# Patient Record
Sex: MALE | URBAN - METROPOLITAN AREA
[De-identification: names, ages, dates, MRNs, and addresses within clinical notes are randomized per-mention and may not be internally consistent; named-entity substitution may affect disease eponyms.]

---

## 2017-04-29 ENCOUNTER — LAB ENCOUNTER (OUTPATIENT)
Dept: LAB | Facility: HOSPITAL | Age: 37
End: 2017-04-29
Attending: FAMILY MEDICINE
Payer: COMMERCIAL

## 2017-04-29 DIAGNOSIS — Z00.00 ROUTINE GENERAL MEDICAL EXAMINATION AT A HEALTH CARE FACILITY: Primary | ICD-10-CM

## 2017-04-29 PROCEDURE — 85025 COMPLETE CBC W/AUTO DIFF WBC: CPT

## 2017-04-29 PROCEDURE — 36415 COLL VENOUS BLD VENIPUNCTURE: CPT

## 2017-04-29 PROCEDURE — 80053 COMPREHEN METABOLIC PANEL: CPT

## 2017-04-29 PROCEDURE — 80061 LIPID PANEL: CPT

## 2024-01-11 ENCOUNTER — HOSPITAL ENCOUNTER (OUTPATIENT)
Age: 44
Discharge: HOME | End: 2024-01-11
Payer: COMMERCIAL

## 2024-01-11 VITALS
DIASTOLIC BLOOD PRESSURE: 84 MMHG | OXYGEN SATURATION: 99 % | HEART RATE: 79 BPM | TEMPERATURE: 99 F | RESPIRATION RATE: 18 BRPM | SYSTOLIC BLOOD PRESSURE: 129 MMHG

## 2024-01-11 DIAGNOSIS — J02.0 STREPTOCOCCAL SORE THROAT: Primary | ICD-10-CM

## 2024-01-11 LAB — S PYO AG THROAT QL: POSITIVE

## 2024-01-11 PROCEDURE — 99203 OFFICE O/P NEW LOW 30 MIN: CPT | Performed by: NURSE PRACTITIONER

## 2024-01-11 PROCEDURE — 87880 STREP A ASSAY W/OPTIC: CPT | Performed by: NURSE PRACTITIONER

## 2024-01-11 RX ORDER — AMOXICILLIN 500 MG/1
500 TABLET, FILM COATED ORAL 2 TIMES DAILY
Qty: 20 TABLET | Refills: 0 | Status: SHIPPED | OUTPATIENT
Start: 2024-01-11 | End: 2024-01-21

## 2024-01-11 NOTE — ED INITIAL ASSESSMENT (HPI)
Pt c/o sore throat and fever, which started last night. Strep positive contact. Alternating tylenol and ibuprofen

## 2024-01-12 NOTE — ED PROVIDER NOTES
Patient Seen in: [facility]      History     Chief Complaint   Patient presents with    Sore Throat    Fever     Stated Complaint: sore throat,fever  Subjective:   43-year-old male presents for a sore throat and fever that started last night.  His son is currently being treated for strep throat.  No difficulty swallowing.  Speech is clear.  He is eating and drinking without vomiting or diarrhea.  No neck stiffness.  No rashes.  No headaches.  No dizziness.  He appears well and nontoxic.      Objective:   History reviewed. No pertinent past medical history.         History reviewed. No pertinent surgical history.           Social History     Socioeconomic History    Marital status:             Review of Systems   Constitutional:  Positive for fever.   HENT:  Positive for sore throat.    All other systems reviewed and are negative.      Positive for stated complaint: sore throat,fever  Other systems are as noted in HPI.  Constitutional and vital signs reviewed.      All other systems reviewed and negative except as noted above.    Physical Exam     ED Triage Vitals [01/11/24 1743]   /84   Pulse 79   Resp 18   Temp 98.6 °F (37 °C)   Temp src Oral   SpO2 99 %   O2 Device None (Room air)     Current:/84   Pulse 79   Temp 98.6 °F (37 °C) (Oral)   Resp 18   SpO2 99%     Physical Exam  Vitals and nursing note reviewed.   Constitutional:       General: He is not in acute distress.     Appearance: He is well-developed. He is not ill-appearing or toxic-appearing.   HENT:      Head: Normocephalic.      Right Ear: Tympanic membrane normal.      Left Ear: Tympanic membrane normal.      Nose: No congestion or rhinorrhea.      Mouth/Throat:      Mouth: Mucous membranes are moist. No oral lesions.      Pharynx: Oropharynx is clear. Posterior oropharyngeal erythema present. No pharyngeal swelling, oropharyngeal exudate or uvula swelling.      Tonsils: No tonsillar exudate or tonsillar  abscesses.   Eyes:      Conjunctiva/sclera: Conjunctivae normal.   Cardiovascular:      Rate and Rhythm: Normal rate and regular rhythm.      Heart sounds: Normal heart sounds. No murmur heard.  Pulmonary:      Effort: Pulmonary effort is normal.      Breath sounds: Normal breath sounds. No wheezing, rhonchi or rales.   Musculoskeletal:      Cervical back: Normal range of motion and neck supple.   Lymphadenopathy:      Cervical: No cervical adenopathy.   Skin:     General: Skin is warm and dry.      Capillary Refill: Capillary refill takes less than 2 seconds.   Neurological:      General: No focal deficit present.      Mental Status: He is alert and oriented to person, place, and time.   Psychiatric:         Mood and Affect: Mood normal.         Behavior: Behavior normal.         ED Course     Labs Reviewed   POCT RAPID STREP - Abnormal; Notable for the following components:       Result Value    POCT Rapid Strep Positive (*)     All other components within normal limits         MDM       Medical Decision Making  The strep test is positive.  I will treat with amoxicillin.  We discussed plan of care including Tylenol or Motrin as needed for pain or fever, pushing fluids, and rest.  He will follow-up as needed with his primary care doctor.    Amount and/or Complexity of Data Reviewed  Labs: ordered.     Details: Strep positive    Risk  OTC drugs.  Prescription drug management.        Disposition and Plan     Clinical Impression:  1. Streptococcal sore throat         Disposition:  Discharge  1/11/2024  6:04 pm    Follow-up:  Zeenat Velasquez  [address]  [place] [address]  [phone]    Schedule an appointment as soon as possible for a visit   As needed          Medications Prescribed:  Discharge Medication List as of 1/11/2024  6:10 PM        START taking these medications    Details   amoxicillin 500 MG Oral Tab Take 1 tablet (500 mg total) by mouth 2 (two) times daily for 10 days., Normal, Disp-20  tablet, R-0

## 2024-01-12 NOTE — DISCHARGE INSTRUCTIONS
Push fluids.  Rest.  Tylenol or ibuprofen as needed for pain or fever.  Take the antibiotics as prescribed.  Follow-up as needed with your doctor.  Return for any concerns.

## 2025-01-30 ENCOUNTER — HOSPITAL ENCOUNTER (OUTPATIENT)
Age: 45
Discharge: HOME | End: 2025-01-30
Payer: COMMERCIAL

## 2025-01-30 VITALS
TEMPERATURE: 98 F | DIASTOLIC BLOOD PRESSURE: 67 MMHG | SYSTOLIC BLOOD PRESSURE: 120 MMHG | RESPIRATION RATE: 18 BRPM | HEART RATE: 57 BPM | OXYGEN SATURATION: 100 %

## 2025-01-30 DIAGNOSIS — B34.9 VIRAL SYNDROME: ICD-10-CM

## 2025-01-30 DIAGNOSIS — J02.9 SORE THROAT: Primary | ICD-10-CM

## 2025-01-30 LAB
POCT INFLUENZA A: NEGATIVE
POCT INFLUENZA B: NEGATIVE
S PYO AG THROAT QL: NEGATIVE

## 2025-01-30 PROCEDURE — 99213 OFFICE O/P EST LOW 20 MIN: CPT | Performed by: NURSE PRACTITIONER

## 2025-01-30 PROCEDURE — 87502 INFLUENZA DNA AMP PROBE: CPT | Performed by: NURSE PRACTITIONER

## 2025-01-30 PROCEDURE — 87880 STREP A ASSAY W/OPTIC: CPT | Performed by: NURSE PRACTITIONER

## 2025-01-30 NOTE — ED PROVIDER NOTES
Patient Seen in: [facility]      History     Chief Complaint   Patient presents with    Sore Throat     Stated Complaint: Sore Throat    Subjective:   HPI    44-year-old male here for evaluation of fatigue, body aches and sore throat that started 3 days ago.  His son is positive for flu a today.  He denies ear pain, abdominal pain, vomiting or diarrhea, cough or shortness of breath.  He is drinking fluids but has a loss of appetite.    Objective:     History reviewed. No pertinent past medical history.           History reviewed. No pertinent surgical history.             Social History     Socioeconomic History    Marital status:    Tobacco Use    Smoking status: Never    Smokeless tobacco: Never   Vaping Use    Vaping status: Never Used   Substance and Sexual Activity    Alcohol use: Yes     Comment: social    Drug use: Never     Social Drivers of Health     Financial Resource Strain: Low Risk  (11/22/2024)    Received from [facility]    Overall Financial Resource Strain (CARDIA)     Difficulty of Paying Living Expenses: Not hard at all   Food Insecurity: Low Risk  (11/22/2024)    Received from [facility] Food Security     Within the past 12 months, the food you bought just didn't last and you didn't have money to get more.: 3     Within the past 12 months, you worried that your food would run out before you got money to buy more.: 3   Transportation Needs: Not At Risk (11/22/2024)    Received from [facility] Transportation Needs     In the past 12 months, has lack of reliable transportation kept you from medical appointments, meetings, work or from getting things needed for daily living?: No   Physical Activity: Sufficiently Active (11/22/2024)    Received from [facility]    Physical Activity     On average, how many days per week do you engage in moderate to strenuous exercise (like a brisk walk)?: 5 days     On average, how many minutes do you engage in exercise at  this level?: 40 min     On average, how many minutes do you engage in exercise at this level?: 40 min     On average, how many days per week do you engage in moderate to strenuous exercise (like a brisk walk)?: 5 days   Stress: No Stress Concern Present (11/22/2024)    Received from [facility] [employer]    [employer] [place] [employer] - Occupational Stress Questionnaire     Feeling of Stress : Only a little   Social Connections: Moderately Isolated (11/22/2024)    Received from [facility] [employer]    Social Connection and Isolation Panel [NHANES]     Frequency of Communication with Friends and Family: More than three times a week     Frequency of Social Gatherings with Friends and Family: More than three times a week     Attends Protestant Services: Never     Active Member of Clubs or Organizations: No     Attends Club or Organization Meetings: Never     Marital Status:    Housing Stability: Not At Risk (11/22/2024)    Received from [facility] [employer]    [facility] Housing     What is your living situation today?: I have a steady place to live              Review of Systems    Positive for stated complaint: Sore Throat  Other systems are as noted in HPI.  Constitutional and vital signs reviewed.      All other systems reviewed and negative except as noted above.    Physical Exam     ED Triage Vitals [01/30/25 1751]   /67   Pulse 57   Resp 18   Temp 98.2 °F (36.8 °C)   Temp src Oral   SpO2 100 %   O2 Device None (Room air)       Current Vitals:   Vital Signs  BP: 120/67  Pulse: 57  Resp: 18  Temp: 98.2 °F (36.8 °C)  Temp src: Oral    Oxygen Therapy  SpO2: 100 %  O2 Device: None (Room air)        Physical Exam  Vitals and nursing note reviewed.   Constitutional:       General: He is not in acute distress.     Appearance: He is well-developed. He is ill-appearing (Fatigued). He is not toxic-appearing or diaphoretic.   HENT:      Right Ear: Tympanic membrane and ear canal normal.      Left Ear: Tympanic membrane and ear  canal normal.      Nose: No congestion or rhinorrhea.      Mouth/Throat:      Mouth: Mucous membranes are moist. No oral lesions.      Pharynx: Oropharynx is clear. Uvula midline. Posterior oropharyngeal erythema present. No pharyngeal swelling, oropharyngeal exudate or uvula swelling.      Tonsils: No tonsillar exudate or tonsillar abscesses.   Eyes:      Pupils: Pupils are equal, round, and reactive to light.   Cardiovascular:      Rate and Rhythm: Normal rate and regular rhythm.   Pulmonary:      Effort: Pulmonary effort is normal. No respiratory distress.      Breath sounds: Normal breath sounds. No stridor. No wheezing, rhonchi or rales.   Musculoskeletal:      Cervical back: Normal range of motion and neck supple.   Skin:     General: Skin is warm.      Findings: No rash.   Neurological:      Mental Status: He is alert and oriented to person, place, and time.   Psychiatric:         Behavior: Behavior normal.           ED Course     Labs Reviewed   POCT RAPID STREP - Normal   POCT FLU TEST - Normal    Narrative:     This assay is a rapid molecular in vitro test utilizing nucleic acid amplification of influenza A and B viral RNA.       ED Course as of 01/30/25 1924  ------------------------------------------------------------  Time: 01/30 1810  Value: POCT Rapid Strep  Comment: (Reviewed)  ------------------------------------------------------------  Time: 01/30 1810  Value: POCT Flu Test  Comment: (Reviewed)          MDM     44-year-old male here for evaluation of bodyaches, fatigue and sore throat    On exam patient fatigued appearing, lungs are clear with no wheezing stridor or crackles, bilateral TM normal, pharynx with erythema but no swelling or exudate.    Differential diagnoses reflecting the complexity of care include but are not limited to: Strep, flu, other viral syndrome  Comorbidities that add complexity to management include: None  History obtained by an independent source was from: Patient  My  independent interpretations of studies include: Flu negative, strep negative    Shared decision making was done by: Patient and myself  Discussions of management was done with: Patient    Patient is fatigued non-toxic and in no acute distress.  Vital signs are stable.   Discussed results and plan.  Patient likely has another viral process or testing too soon for flu A.  His wife was asking about Xofluza, which his youngest son received from pediatrician due to older one testing positive for FLU A today.  Advised that I have not prescribed this in the past and that it could be a postexposure prophylaxis prescription.  Patient will contact his primary care provider if needed.    Discussed him symptoms are 3 days out, continuing treating symptoms with Tylenol Motrin as needed, p.o. fluids, warm teas, throat lozenges.  Discussed salt water gargles for sore throat.  All questions answered. Return and ER precautions given.    Counseled: Patient (and guardian if applicable), regarding diagnosis, regarding treatment plan, regarding diagnostic results, regarding prescription, I have discussed with the patient the results of tests, differential diagnosis, and warning signs and symptoms that should prompt immediate return or ER visit. The patient understands these instructions and agrees to the follow-up plan provided. There is no barriers to learning. Appropriate f/u given. Patient agrees to seek medical attention for any concerns/problems/complications.    Medical Decision Making      Disposition and Plan     Clinical Impression:  1. Sore throat    2. Viral syndrome         Disposition:  Discharge  1/30/2025  6:20 pm    Follow-up:  Zeenat Velasquez  [address]  [place] [id number] [phone]      As needed          Medications Prescribed:  There are no discharge medications for this patient.          Supplementary Documentation:

## 2025-01-31 NOTE — DISCHARGE INSTRUCTIONS
Viral infections are usually the worst days 3-5, but can last up to 14 days..  Viral infections do not improve with the use of antibiotics.  Harmony diet, increase water intake; gatorade or pedialyte   Runny Nose/Congestion:  -Cool mist Humidifier at bedside   -Vicks vaporub under nose and chest wall  - Nasal Rinse ([place] Pot)  - Flonase nasal spray to each nostril once or twice daily if needed  - Antihistamine (Allegra, Zyrtec, Claritin) once daily if needed  - Nasal Decongestant (Sudafed) if needed; if you have high blood pressure max use 2 days  Cough:  - Mucinex OR Robitussin  - Warm tea w/honey  Sore Throat:  - Salt water gargle 2x/day  -Throat lozenges  Fever:  Tylenol or Motrin every 6 hours for fever > 100.4. You can alternate between the two as well if fever high.  Follow up with your primary care provider in the next 1-2 weeks if symptoms persist.  Go to the emergency room with chest pain, shortness of breath, dizziness, vomiting and unable to keep down fluids/medications.